# Patient Record
Sex: MALE | Race: WHITE | Employment: FULL TIME | URBAN - METROPOLITAN AREA
[De-identification: names, ages, dates, MRNs, and addresses within clinical notes are randomized per-mention and may not be internally consistent; named-entity substitution may affect disease eponyms.]

---

## 2017-05-08 ENCOUNTER — OFFICE VISIT (OUTPATIENT)
Dept: HEMATOLOGY | Age: 57
End: 2017-05-08

## 2017-05-08 ENCOUNTER — HOSPITAL ENCOUNTER (OUTPATIENT)
Dept: LAB | Age: 57
Discharge: HOME | End: 2017-05-08
Payer: COMMERCIAL

## 2017-05-08 VITALS
BODY MASS INDEX: 38.28 KG/M2 | DIASTOLIC BLOOD PRESSURE: 84 MMHG | SYSTOLIC BLOOD PRESSURE: 149 MMHG | TEMPERATURE: 97.5 F | HEART RATE: 73 BPM | OXYGEN SATURATION: 96 % | HEIGHT: 72 IN | RESPIRATION RATE: 18 BRPM | WEIGHT: 282.6 LBS

## 2017-05-08 DIAGNOSIS — R74.8 ELEVATED LIVER ENZYMES: ICD-10-CM

## 2017-05-08 DIAGNOSIS — R74.8 ELEVATED LIVER ENZYMES: Primary | ICD-10-CM

## 2017-05-08 PROBLEM — I10 HTN (HYPERTENSION): Status: ACTIVE | Noted: 2017-05-08

## 2017-05-08 PROBLEM — E11.9 DIABETES MELLITUS TYPE 2, CONTROLLED (HCC): Status: ACTIVE | Noted: 2017-05-08

## 2017-05-08 PROBLEM — G47.33 OSA (OBSTRUCTIVE SLEEP APNEA): Status: ACTIVE | Noted: 2017-05-08

## 2017-05-08 PROBLEM — K21.9 GERD (GASTROESOPHAGEAL REFLUX DISEASE): Status: ACTIVE | Noted: 2017-05-08

## 2017-05-08 PROBLEM — E78.5 HYPERLIPIDEMIA: Status: ACTIVE | Noted: 2017-05-08

## 2017-05-08 PROBLEM — F90.9 ADHD (ATTENTION DEFICIT HYPERACTIVITY DISORDER): Status: ACTIVE | Noted: 2017-05-08

## 2017-05-08 LAB
ALBUMIN SERPL BCP-MCNC: 4.2 G/DL (ref 3.4–5)
ALBUMIN/GLOB SERPL: 1.2 {RATIO} (ref 0.8–1.7)
ALP SERPL-CCNC: 49 U/L (ref 45–117)
ALT SERPL-CCNC: 83 U/L (ref 16–61)
ANION GAP BLD CALC-SCNC: 12 MMOL/L (ref 3–18)
AST SERPL W P-5'-P-CCNC: 62 U/L (ref 15–37)
BASOPHILS # BLD AUTO: 0 K/UL (ref 0–0.06)
BASOPHILS # BLD: 1 % (ref 0–2)
BILIRUB DIRECT SERPL-MCNC: 0.1 MG/DL (ref 0–0.2)
BILIRUB SERPL-MCNC: 0.5 MG/DL (ref 0.2–1)
BUN SERPL-MCNC: 21 MG/DL (ref 7–18)
BUN/CREAT SERPL: 17 (ref 12–20)
CALCIUM SERPL-MCNC: 9.5 MG/DL (ref 8.5–10.1)
CHLORIDE SERPL-SCNC: 104 MMOL/L (ref 100–108)
CO2 SERPL-SCNC: 26 MMOL/L (ref 21–32)
CREAT SERPL-MCNC: 1.24 MG/DL (ref 0.6–1.3)
DIFFERENTIAL METHOD BLD: ABNORMAL
EOSINOPHIL # BLD: 0.2 K/UL (ref 0–0.4)
EOSINOPHIL NFR BLD: 3 % (ref 0–5)
ERYTHROCYTE [DISTWIDTH] IN BLOOD BY AUTOMATED COUNT: 13.3 % (ref 11.6–14.5)
ETHANOL SERPL-MCNC: <3 MG/DL (ref 0–3)
FERRITIN SERPL-MCNC: 494 NG/ML (ref 8–388)
GLOBULIN SER CALC-MCNC: 3.6 G/DL (ref 2–4)
GLUCOSE SERPL-MCNC: 103 MG/DL (ref 74–99)
HCT VFR BLD AUTO: 39.1 % (ref 36–48)
HGB BLD-MCNC: 13.2 G/DL (ref 13–16)
INR PPP: 1 (ref 0.8–1.2)
IRON SATN MFR SERPL: 13 %
IRON SERPL-MCNC: 53 UG/DL (ref 50–175)
LYMPHOCYTES # BLD AUTO: 22 % (ref 21–52)
LYMPHOCYTES # BLD: 1.5 K/UL (ref 0.9–3.6)
MCH RBC QN AUTO: 30.3 PG (ref 24–34)
MCHC RBC AUTO-ENTMCNC: 33.8 G/DL (ref 31–37)
MCV RBC AUTO: 89.7 FL (ref 74–97)
MONOCYTES # BLD: 0.5 K/UL (ref 0.05–1.2)
MONOCYTES NFR BLD AUTO: 7 % (ref 3–10)
NEUTS SEG # BLD: 4.7 K/UL (ref 1.8–8)
NEUTS SEG NFR BLD AUTO: 67 % (ref 40–73)
PLATELET # BLD AUTO: 251 K/UL (ref 135–420)
PMV BLD AUTO: 9.5 FL (ref 9.2–11.8)
POTASSIUM SERPL-SCNC: 3.7 MMOL/L (ref 3.5–5.5)
PROT SERPL-MCNC: 7.8 G/DL (ref 6.4–8.2)
PROTHROMBIN TIME: 12.7 SEC (ref 11.5–15.2)
RBC # BLD AUTO: 4.36 M/UL (ref 4.7–5.5)
SODIUM SERPL-SCNC: 142 MMOL/L (ref 136–145)
TIBC SERPL-MCNC: 417 UG/DL (ref 250–450)
WBC # BLD AUTO: 6.9 K/UL (ref 4.6–13.2)

## 2017-05-08 PROCEDURE — 83540 ASSAY OF IRON: CPT | Performed by: INTERNAL MEDICINE

## 2017-05-08 PROCEDURE — 82728 ASSAY OF FERRITIN: CPT | Performed by: INTERNAL MEDICINE

## 2017-05-08 PROCEDURE — 87340 HEPATITIS B SURFACE AG IA: CPT | Performed by: INTERNAL MEDICINE

## 2017-05-08 PROCEDURE — 82103 ALPHA-1-ANTITRYPSIN TOTAL: CPT | Performed by: INTERNAL MEDICINE

## 2017-05-08 PROCEDURE — 82977 ASSAY OF GGT: CPT | Performed by: INTERNAL MEDICINE

## 2017-05-08 PROCEDURE — 82390 ASSAY OF CERULOPLASMIN: CPT | Performed by: INTERNAL MEDICINE

## 2017-05-08 PROCEDURE — 80048 BASIC METABOLIC PNL TOTAL CA: CPT | Performed by: INTERNAL MEDICINE

## 2017-05-08 PROCEDURE — 83516 IMMUNOASSAY NONANTIBODY: CPT | Performed by: INTERNAL MEDICINE

## 2017-05-08 PROCEDURE — 86708 HEPATITIS A ANTIBODY: CPT | Performed by: INTERNAL MEDICINE

## 2017-05-08 PROCEDURE — 36415 COLL VENOUS BLD VENIPUNCTURE: CPT | Performed by: INTERNAL MEDICINE

## 2017-05-08 PROCEDURE — 81256 HFE GENE: CPT | Performed by: INTERNAL MEDICINE

## 2017-05-08 PROCEDURE — 85610 PROTHROMBIN TIME: CPT | Performed by: INTERNAL MEDICINE

## 2017-05-08 PROCEDURE — 86704 HEP B CORE ANTIBODY TOTAL: CPT | Performed by: INTERNAL MEDICINE

## 2017-05-08 PROCEDURE — 86038 ANTINUCLEAR ANTIBODIES: CPT | Performed by: INTERNAL MEDICINE

## 2017-05-08 PROCEDURE — 80076 HEPATIC FUNCTION PANEL: CPT | Performed by: INTERNAL MEDICINE

## 2017-05-08 PROCEDURE — 85025 COMPLETE CBC W/AUTO DIFF WBC: CPT | Performed by: INTERNAL MEDICINE

## 2017-05-08 PROCEDURE — 82107 ALPHA-FETOPROTEIN L3: CPT | Performed by: INTERNAL MEDICINE

## 2017-05-08 PROCEDURE — 80307 DRUG TEST PRSMV CHEM ANLYZR: CPT | Performed by: INTERNAL MEDICINE

## 2017-05-08 PROCEDURE — 86706 HEP B SURFACE ANTIBODY: CPT | Performed by: INTERNAL MEDICINE

## 2017-05-08 NOTE — PROGRESS NOTES
93 Leanne Campos MD, QUINCY Collado PA-C Terris Bast, MD, FACP, MD Yusra Sanchez, QUINCY Callaway NP        [address] [facility], [address] [address]  22.     [phone]     FAX: 411 [address], [address]     [place], [address]     [phone]     FAX: [phone]           Patient Care Team:  Khanh Melendez MD as PCP - General (Family Practice)      Problem List  Date Reviewed: 3/10/2014          Codes Class Noted    Diabetes mellitus type 2, controlled ([facility] [address] ICD-10-CM: E11.9  ICD-9-CM: 250.00  5/8/2017        HTN (hypertension) ICD-10-CM: I10  ICD-9-CM: 401.9  5/8/2017        Hyperlipidemia ICD-10-CM: E78.5  ICD-9-CM: 272.4  5/8/2017        GERD (gastroesophageal reflux disease) ICD-10-CM: K21.9  ICD-9-CM: 530.81  5/8/2017        KAM (obstructive sleep apnea) ICD-10-CM: S92.44  ICD-9-CM: 327.23  5/8/2017        ADHD (attention deficit hyperactivity disorder) ICD-10-CM: F90.9  ICD-9-CM: 314.01  5/8/2017        Ureteral stone ICD-10-CM: N20.1  ICD-9-CM: 592.1  3/10/2014                The physicians listed above have asked me to see Maria Guadaluperosie Kimball. in consultation regarding elevated liver enzymes and elevated ferritin. No medical records were available for review when the patient was here for the appointment. The patient is a 60 y.o.  male who was first noted to have abnormalities in liver enzymes and ferritin in recent months during routine physical by PCM. Serologic evaluation for markers of chronic liver disease were either not performed or available to me. Patient reports having a negative viral hepatitis and HIV panel. Imaging of the liver was either not performed or the results are not available to me.       An assessment of liver fibrosis with biopsy or elastography has not been performed. Patient consumes 2-3 alcohlic beverages every day on average. Usually liquor. The patient had not started any new medications within 3 months preceeding the elevation in liver chemistries. He is on allopurinol for kidney stones. The patient has no symptoms which could be attributed to the liver disorder. The patient completes all daily activities without any functional limitations. ALLERGIES  Allergies   Allergen Reactions    Bee Sting [Sting, Bee] Swelling       MEDICATIONS  Current Outpatient Prescriptions   Medication Sig    oxyCODONE-acetaminophen (PERCOCET)  mg per tablet Take 1 Tab by mouth every four (4) hours as needed for Pain.  oxyCODONE-acetaminophen (PERCOCET)  mg per tablet Take 1 Tab by mouth every four (4) hours as needed for Pain.  lansoprazole (PREVACID) 30 mg capsule Take 30 mg by mouth Daily (before breakfast). Takes in afternoon    levofloxacin (LEVAQUIN) 500 mg tablet Take 500 mg by mouth daily.  atorvastatin (LIPITOR) 40 mg tablet Take 40 mg by mouth daily.  fenofibrate (TRICOR) 160 mg tablet Take 160 mg by mouth daily.  fluticasone (FLONASE) 50 mcg/actuation nasal spray 2 Sprays by Both Nostrils route daily.  HYDROcodone-acetaminophen (NORCO) 5-325 mg per tablet Take 1 Tab by mouth every four (4) hours as needed for Pain.  aspirin delayed-release 81 mg tablet Take 81 mg by mouth daily.  tamsulosin (FLOMAX) 0.4 mg capsule Take 0.4 mg by mouth daily. No current facility-administered medications for this visit. SYSTEM REVIEW NOT RELATED TO LIVER DISEASE OR REVIEWED ABOVE:  Constitution systems: Negative for fever, chills, weight gain, weight loss. Eyes: Negative for visual changes. ENT: Negative for sore throat, painful swallowing. Respiratory: Negative for cough, hemoptysis, SOB. Cardiology: Negative for chest pain, palpitations.   GI:  Negative for constipation or diarrhea. : Negative for urinary frequency, dysuria, hematuria, nocturia. Skin: Negative for rash. Hematology: Negative for easy bruising, blood clots. Musculo-skelatal: Negative for back pain, muscle pain, weakness. Neurologic: Negative for headaches, dizziness, vertigo, memory problems not related to HE. Psychology: Negative for anxiety, depression. FAMILY HISTORY:    There is no family history of liver disease. SOCIAL HISTORY:  The patient is . The patient has 2  Children. The patient quit smoking 13 years ago. The patient consumes 2-3 alcoholic (liquor) beverages per day on average. 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 PHYSICAL EXAMINATION:  Visit Vitals    /84 (BP 1 Location: Left arm, BP Patient Position: Sitting)    Pulse 73    Temp 97.5 °F (36.4 °C) (Tympanic)    Resp 18    Ht 6' (1.829 m)    Wt 282 lb 9.6 oz (128.2 kg)    SpO2 96%    BMI 38.33 kg/m2     General: No acute distress. Eyes: Sclera anicteric. ENT: No oral lesions. Thyroid normal.  Nodes: No adenopathy. Skin: No spider angiomata. No jaundice. No palmar erythema. Respiratory: Lungs clear to auscultation. Cardiovascular: Regular heart rate. No murmurs. No JVD. Abdomen: Soft non-tender. Liver size normal to percussion/palpation. Spleen not palpable. No obvious ascites. Extremities: No edema. No muscle wasting. No gross arthritic changes. Neurologic: Alert and oriented. Cranial nerves grossly intact. No asterixis. LABORATORY STUDIES:  [facility] of [address] [place] [address] & Units 3/7/2014   HGB 13.0 - 16.0 g/dL 12. 6(L)   BUN 7.0 - 18 MG/DL 27(H)   Creat 0.6 - 1.3 MG/DL 2.04(H)   Na 136 - 145 mmol/L 141   K 3.5 - 5.5 mmol/L 4.1   Cl 100 - 108 mmol/L 106   CO2 21 - 32 mmol/L 24   Glucose 74 - 99 mg/dL 168(H)       Recent liver function panel, CBC with platelet count and BMP are not available.   These studies will be performed. SEROLOGIES:  Not available or performed. Testing was performed today. LIVER HISTOLOGY:  Not available or performed    ENDOSCOPIC PROCEDURES:  Not available or performed    RADIOLOGY:  Not available or performed    OTHER TESTING:  Not available or performed    ASSESSMENT AND PLAN:    Elevation in liver enzymes and ferritin of unknown chronicity or etiology. I do not have labs to review. I can see the office note from [facility] Dr. Ev Haney mentions elevated ferritin and transaminases. I suspect he may have fatty liver due to ETOH and metabolic syndrome. There ferritin may be due to secondary iron overload as a result. Will perform additional serologic tests to screen for other causes of chronic liver disease. I will also check mutation for hemochromatosis. Will perform imaging of the liver with ultrasound. Elastography will be performed to assess for fibrosis. The patient was counseled regarding alcohol consumption. The need for vaccination against viral hepatitis A and B will be assessed with serologic and instituted as appropriate. All of the above issues were discussed with the patient. All questions were answered. The patient expressed a clear understanding of the above. 11 [facility] in 4-6 weeks to review all data and determine the treatment plan.               Johana Carmen MD  Liver [place] of [address], [address] [facility], [address]  [phone]  FAX: [phone]

## 2017-05-08 NOTE — MR AVS SNAPSHOT
Visit Information Date & Time Provider Department Dept. Phone Encounter #  
 5/8/2017  3:40 PM Robina Mathew MD Liver [place] of [facility] [id number] Upcoming Health Maintenance Date Due Hepatitis C Screening 1960 DTaP/Tdap/Td series (1 - Tdap) 12/18/1979 FOBT Q 1 YEAR AGE 50-75 12/18/2008 INFLUENZA AGE 9 TO ADULT 8/1/2017 Allergies as of 5/8/2017  Review Complete On: 5/8/2017 By: Stacey Howell Severity Noted Reaction Type Reactions Bee Sting [Sting, Bee]  03/10/2014    Swelling Current Immunizations  Never Reviewed No immunizations on file. Not reviewed this visit You Were Diagnosed With   
  
 Codes Comments Elevated liver enzymes    -  Primary ICD-10-CM: R74.8 ICD-9-CM: 790.5 Vitals BP Pulse Temp Resp Height(growth percentile) 149/84 (BP 1 Location: Left arm, BP Patient Position: Sitting) 73 97.5 °F (36.4 °C) (Tympanic) 18 6' (1.829 m) Weight(growth percentile) SpO2 BMI Smoking Status 282 lb 9.6 oz (128.2 kg) 96% 38.33 kg/m2 Former Smoker Vitals History BMI and BSA Data Body Mass Index Body Surface Area  
 38.33 kg/m 2 2.55 m 2 Your Updated Medication List  
  
   
This list is accurate as of: 5/8/17  4:26 PM.  Always use your most recent med list.  
  
  
  
  
 aspirin delayed-release 81 mg tablet Take 81 mg by mouth daily. atorvastatin 40 mg tablet Commonly known as:  LIPITOR Take 40 mg by mouth daily. fenofibrate 160 mg tablet Commonly known as:  LOFIBRA Take 160 mg by mouth daily. fluticasone 50 mcg/actuation nasal spray Commonly known as:  Florin Bumpers 2 Sprays by Both Nostrils route daily. HYDROcodone-acetaminophen 5-325 mg per tablet Commonly known as:  Felicia Urban Take 1 Tab by mouth every four (4) hours as needed for Pain.  
  
 lansoprazole 30 mg capsule Commonly known as:  PREVACID  
 Take 30 mg by mouth Daily (before breakfast). Takes in afternoon  
  
 levoFLOXacin 500 mg tablet Commonly known as:  Phyllis Sleeper Take 500 mg by mouth daily. * oxyCODONE-acetaminophen  mg per tablet Commonly known as:  PERCOCET Take 1 Tab by mouth every four (4) hours as needed for Pain. * oxyCODONE-acetaminophen  mg per tablet Commonly known as:  PERCOCET Take 1 Tab by mouth every four (4) hours as needed for Pain.  
  
 tamsulosin 0.4 mg capsule Commonly known as:  FLOMAX Take 0.4 mg by mouth daily. * Notice: This list has 2 medication(s) that are the same as other medications prescribed for you. Read the directions carefully, and ask your doctor or other care provider to review them with you. To-Do List   
 05/08/2017 Lab:  ACTIN (SMOOTH MUSCLE) ANTIBODY   
  
 05/08/2017 Lab:  AFP WITH AFP-L3% 05/08/2017 Lab:  ALPHA-1-ANTITRYPSIN, TOTAL   
  
 05/08/2017 Lab:  ANTINUCLEAR ANTIBODIES, IFA   
  
 05/08/2017 Lab:  CBC WITH AUTOMATED DIFF   
  
 05/08/2017 Lab:  CERULOPLASMIN   
  
 05/08/2017 Lab:  ETHYL ALCOHOL   
  
 05/08/2017 Lab:  FERRITIN   
  
 05/08/2017 Lab:  GGT   
  
 05/08/2017 Lab:  HEMOCHROMATOSIS MUTATION   
  
 05/08/2017 Lab:  HEP A AB, TOTAL   
  
 05/08/2017 Lab:  HEP B SURFACE AB   
  
 05/08/2017 Lab:  HEP B SURFACE AG   
  
 05/08/2017 Lab:  HEPATIC FUNCTION PANEL   
  
 05/08/2017 Lab:  HEPATITIS B CORE AB, TOTAL   
  
 05/08/2017 Lab:  IRON PROFILE   
  
 05/08/2017 Lab:  METABOLIC PANEL, BASIC   
  
 05/08/2017 Lab:  MITOCHONDRIAL M2 AB   
  
 05/08/2017 Lab:  PROTHROMBIN TIME + INR   
  
 05/08/2017 Imaging:  US ABD LTD W ELASTOGRAPHY Introducing [facility]! [facility] introduces [employer] patient portal. Now you can access parts of your medical record, email your doctor's office, and request medication refills online. 1. In your internet browser, go to [email or web address]. [employer] 2. Click on the First Time User? Click Here link in the Sign In box. You will see the New Member Sign Up page. 3. Enter your Dime Access Code exactly as it appears below. You will not need to use this code after youve completed the sign-up process. If you do not sign up before the expiration date, you must request a new code. · Dime Access Code: [email or web address] Expires: 8/6/2017  4:25 PM 
 
4. Enter the last four digits of your Social Security Number (xxxx) and Date of Birth (mm/dd/yyyy) as indicated and click Submit. You will be taken to the next sign-up page. 5. Create a Summlyt ID. This will be your Dime login ID and cannot be changed, so think of one that is secure and easy to remember. 6. Create a Dime password. You can change your password at any time. 7. Enter your Password Reset Question and Answer. This can be used at a later time if you forget your password. 8. Enter your e-mail address. You will receive e-mail notification when new information is available in [address] 9. Click Sign Up. You can now view and download portions of your medical record. 10. Click the Download Summary menu link to download a portable copy of your medical information. If you have questions, please visit the Frequently Asked Questions section of the Dime website. Remember, Dime is NOT to be used for urgent needs. For medical emergencies, dial 911. Now available from your iPhone and Android! Please provide this summary of care documentation to your next provider. Your primary care clinician is listed as Barbie Galdamez. If you have any questions after today's visit, please call [phone].

## 2017-05-09 LAB
A1AT SERPL-MCNC: 142 MG/DL (ref 90–200)
ACTIN IGG SERPL-ACNC: 19 UNITS (ref 0–19)
AFP L3 MFR SERPL: NORMAL % (ref 0–9.9)
AFP SERPL-MCNC: 2.5 NG/ML (ref 0–8)
ANA TITR SER IF: NEGATIVE {TITER}
CERULOPLASMIN SERPL-MCNC: 27.3 MG/DL (ref 16–31)
GGT SERPL-CCNC: 50 IU/L (ref 0–65)
HAV AB SER QL IA: NEGATIVE
HBV CORE AB SERPL QL IA: NEGATIVE
HBV SURFACE AB SER QL IA: POSITIVE
HBV SURFACE AB SERPL IA-ACNC: 55.3 MIU/ML
HBV SURFACE AG SER QL: <0.1 INDEX
HBV SURFACE AG SER QL: NEGATIVE
HEP BS AB COMMENT,HBSAC: NORMAL
MITOCHONDRIA M2 IGG SER-ACNC: 4.5 UNITS (ref 0–20)

## 2017-05-12 LAB — HFE GENE MUT ANL BLD/T: NORMAL

## 2017-07-13 ENCOUNTER — HOSPITAL ENCOUNTER (OUTPATIENT)
Dept: ULTRASOUND IMAGING | Age: 57
Discharge: HOME | End: 2017-07-13
Attending: INTERNAL MEDICINE
Payer: COMMERCIAL

## 2017-07-13 DIAGNOSIS — R74.8 ELEVATED LIVER ENZYMES: ICD-10-CM

## 2017-07-13 PROCEDURE — 0346T US ABD LTD W ELASTOGRAPHY: CPT

## 2018-04-04 ENCOUNTER — HOSPITAL ENCOUNTER (OUTPATIENT)
Dept: LAB | Age: 58
Discharge: HOME | End: 2018-04-04
Payer: COMMERCIAL

## 2018-04-04 ENCOUNTER — OFFICE VISIT (OUTPATIENT)
Dept: HEMATOLOGY | Age: 58
End: 2018-04-04

## 2018-04-04 VITALS
OXYGEN SATURATION: 96 % | DIASTOLIC BLOOD PRESSURE: 88 MMHG | BODY MASS INDEX: 37.11 KG/M2 | HEART RATE: 64 BPM | HEIGHT: 72 IN | SYSTOLIC BLOOD PRESSURE: 150 MMHG | WEIGHT: 274 LBS | TEMPERATURE: 99.5 F | RESPIRATION RATE: 18 BRPM

## 2018-04-04 DIAGNOSIS — K76.0 NAFL (NONALCOHOLIC FATTY LIVER): Primary | ICD-10-CM

## 2018-04-04 DIAGNOSIS — K76.0 NAFL (NONALCOHOLIC FATTY LIVER): ICD-10-CM

## 2018-04-04 LAB
ALBUMIN SERPL-MCNC: 4.4 G/DL (ref 3.4–5)
ALBUMIN/GLOB SERPL: 1.4 {RATIO} (ref 0.8–1.7)
ALP SERPL-CCNC: 52 U/L (ref 45–117)
ALT SERPL-CCNC: 44 U/L (ref 16–61)
AST SERPL-CCNC: 30 U/L (ref 15–37)
BILIRUB DIRECT SERPL-MCNC: 0.1 MG/DL (ref 0–0.2)
BILIRUB SERPL-MCNC: 0.5 MG/DL (ref 0.2–1)
GLOBULIN SER CALC-MCNC: 3.2 G/DL (ref 2–4)
PROT SERPL-MCNC: 7.6 G/DL (ref 6.4–8.2)

## 2018-04-04 PROCEDURE — 36415 COLL VENOUS BLD VENIPUNCTURE: CPT | Performed by: INTERNAL MEDICINE

## 2018-04-04 PROCEDURE — 80076 HEPATIC FUNCTION PANEL: CPT | Performed by: INTERNAL MEDICINE

## 2018-04-04 RX ORDER — ALLOPURINOL 100 MG/1
TABLET ORAL DAILY
COMMUNITY

## 2018-04-04 NOTE — PROGRESS NOTES
MD Avi, [address], [place], [place]       Alyssa Wild, ELLIS Garcia, PEG-BC   Beronica Epps, QUINCY [facility]    at [address]    [address] [facility], [address] [place] [address], [facility] 22.    [phone]    FAX: [address] [place] [address], [address]    [phone]    FAX: [phone]       Patient Care Team:  Jorge Armijo MD as PCP - General (Family Practice)      Problem List  Date Reviewed: 3/10/2014          Codes Class Noted    Diabetes mellitus type 2, controlled ([facility] [address] ICD-10-CM: E11.9  ICD-9-CM: 250.00  5/8/2017        HTN (hypertension) ICD-10-CM: I10  ICD-9-CM: 401.9  5/8/2017        Hyperlipidemia ICD-10-CM: E78.5  ICD-9-CM: 272.4  5/8/2017        GERD (gastroesophageal reflux disease) ICD-10-CM: K21.9  ICD-9-CM: 530.81  5/8/2017        KAM (obstructive sleep apnea) ICD-10-CM: M91.71  ICD-9-CM: 327.23  5/8/2017        ADHD (attention deficit hyperactivity disorder) ICD-10-CM: F90.9  ICD-9-CM: 314.01  5/8/2017        Ureteral stone ICD-10-CM: N20.1  ICD-9-CM: 592.1  3/10/2014              Sunitarosie Trent. returns to the [address] for management of elevated liver enzymes. The active problem list, all pertinent past medical history, medications, radiologic findings and laboratory findings related to the liver disorder were reviewed with the patient. The patient is a 59 y.o.  male who was noted to have abnormalities in liver enzymes and ferritin in recent months during routine examination. The most recent imaging of the liver was Ultrasound performed in 7/2017. Results suggest fatty liver disease.       Assessment of liver fibrosis was performed with sheer wave elastogrphy at [facility] in 7/2017. The result was 5.4 kPa which correlates no fibrosis - stage 1 portal fibrosis    The patient has no symptoms which can be attributed to the liver disorder. The patient has not experienced fatigue, pain in the right side over the liver,     The patient completes all daily activities without any functional limitations. All of the issues listed in the Assessment and Plan were discussed with the patient. All questions were answered. The patient expressed a clear understanding of the above. [facility] 87 in 6 months for elastography for routine monitoring. ASSESSMENT AND PLAN:  Intermittent elevation in liver transaminases   Liver transaminases are now normal.  ALP is normal.  Liver function is normal.  The platelet count is normal.    Based upon laboratory studies Elastography, and imaging  the patient does not appear to have significant liver injury. Serologic testing for causes of chronic liver disease were negative     There is no reason to perform liver biopsy at this time. Liver chemistries will be monitored. The most likely causes for the liver chemistry abnormalities were discussed with the patient and include fatty liver disease, most likely from NAFLD but given daily alcohol consumption some may be due to alcohol. The patient was advised to reduce his alcohol consumption is non-daily and see if this helps the liver enzymes. The patient was counseled regarding diet and exercise to achieve weight loss. The best diet for patients with fatty liver is one very low in carbohydrates and enriched with protein such as an Abdullahi's program.    The patient was told to consume any food products and drinks containing fructose as this enhances hepatic fat synthesis. There is no medication or vitamin supplements that we advocate for fatty liver.   Using glitazones in patients without diabetes mellitus has been shown to reduce fat content in the liver but has no effect on fibrosis and is associated with weight gain. Vitamin E has also been used but the data is not very good and most experts no longer advocate this. The need to assess liver fibrosis was discussed. Sheer wave elastography can assess liver fibrosis and determine if a patient has advanced fibrosis or cirrhosis without the need for liver biopsy. Sheer wave elastography is now available at [facility]. Elastography can be repeated annually to assess for fibrosis progression and need for treatment of the liver disorder. If liver enzymes remain normal and elastography is a bit better in 6 months than either no further follow-up is needed or frequency of repeat elastography could be reduced to every 2 years. Treatment of other medical problems in patients with chronic liver disease  There are no contraindications for the patient to take any medications that are necessary for treatment of other medical issues. The patient can take oral diabetic agents for treatment of diabetes and statins for treatment of hypercholesterolemia. This will not impact the patient's current liver disease. The patient consumes alcohol on a regular basis. This increases the risk of toxicity from acetaminophen. This analgesic should be avoided until the patient has been abstinent from alcohol for 6 months. The patient does not have cirrhosis. Normal doses of NSAIDs can be used for pain as needed. Counseling for alcohol in patients with chronic liver disease  The patient was counseled regarding alcohol consumption and the effect of alcohol on chronic liver disease. The patient was reminded that alcohol can cause fatty liver. Vaccinations   Vaccination for viral hepatitis A is recommended since the patient has no serologic evidence of previous exposure or vaccination with immunity. Vaccination for viral hepatitis B is not needed.   The patient has serologic evidence of prior exposure or vaccination with immunity. Routine vaccinations against other bacterial and viral agents can be performed as indicated. Annual flu vaccination should be administered if indicated. ALLERGIES  Allergies   Allergen Reactions    Bee Sting [Sting, Bee] Swelling       MEDICATIONS  Current Outpatient Prescriptions   Medication Sig    losartan 50 mg tab 100 mg, hydroCHLOROthiazide 12.5 mg cap 12.5 mg Take  by mouth daily.  allopurinol (ZYLOPRIM) 100 mg tablet Take  by mouth daily.  DEXTROAMPHETAMINE/AMPHETAMINE (AMPHETAMINE SALT COMBO PO) Take 15 mg by mouth.  atorvastatin (LIPITOR) 40 mg tablet Take 40 mg by mouth daily.  fenofibrate (TRICOR) 160 mg tablet Take 160 mg by mouth daily.  fluticasone (FLONASE) 50 mcg/actuation nasal spray 2 Sprays by Both Nostrils route daily.  aspirin delayed-release 81 mg tablet Take 81 mg by mouth daily.  oxyCODONE-acetaminophen (PERCOCET)  mg per tablet Take 1 Tab by mouth every four (4) hours as needed for Pain.  oxyCODONE-acetaminophen (PERCOCET)  mg per tablet Take 1 Tab by mouth every four (4) hours as needed for Pain.  lansoprazole (PREVACID) 30 mg capsule Take 30 mg by mouth Daily (before breakfast). Takes in afternoon    tamsulosin (FLOMAX) 0.4 mg capsule Take 0.4 mg by mouth daily.  levofloxacin (LEVAQUIN) 500 mg tablet Take 500 mg by mouth daily.  HYDROcodone-acetaminophen (NORCO) 5-325 mg per tablet Take 1 Tab by mouth every four (4) hours as needed for Pain. No current facility-administered medications for this visit. SYSTEM REVIEW NOT RELATED TO LIVER DISEASE OR REVIEWED ABOVE:  Constitution systems: Negative for fever, chills, weight gain, weight loss. Eyes: Negative for visual changes. ENT: Negative for sore throat, painful swallowing. Respiratory: Negative for cough, hemoptysis, SOB. Cardiology: Negative for chest pain, palpitations. GI:  Negative for constipation or diarrhea.   : Negative for urinary frequency, dysuria, hematuria, nocturia. Skin: Negative for rash. Hematology: Negative for easy bruising, blood clots. Musculo-skelatal: Negative for back pain, muscle pain, weakness. Neurologic: Negative for headaches, dizziness, vertigo, memory problems not related to HE. Psychology: Negative for anxiety, depression. FAMILY HISTORY:  The father  of heart disaase  Mother  of cancer  There is no family history of liver disease. SOCIAL HISTORY:  The patient is . The patient has 2  Children. The patient quit smoking . The patient consumes 2-3 alcoholic beverages per day on average. 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 PHYSICAL EXAMINATION:  Visit Vitals    /88 (BP 1 Location: Right arm, BP Patient Position: Sitting)    Pulse 64    Temp 99.5 °F (37.5 °C) (Tympanic)    Resp 18    Ht 6' (1.829 m)    Wt 274 lb (124.3 kg)    SpO2 96%    BMI 37.16 kg/m2     General: No acute distress. Eyes: Sclera anicteric. ENT: No oral lesions. Thyroid normal.  Nodes: No adenopathy. Skin: No spider angiomata. No jaundice. No palmar erythema. Respiratory: Lungs clear to auscultation. Cardiovascular: Regular heart rate. No murmurs. No JVD. Abdomen: Soft non-tender. Liver size normal to percussion/palpation. Spleen not palpable. No obvious ascites. Extremities: No edema. No muscle wasting. No gross arthritic changes. Neurologic: Alert and oriented. Cranial nerves grossly intact. No asterixis.     LABORATORY STUDIES:  Liver [place] of [address] Units 2018   WBC 4.6 - 13.2 K/uL  6.9   ANC 1.8 - 8.0 K/UL  4.7   HGB 13.0 - 16.0 g/dL  13.2    - 420 K/uL  251   INR 0.8 - 1.2    1.0   AST 15 - 37 U/L 30 62 (H)   ALT 16 - 61 U/L 44 83 (H)   Alk Phos 45 - 117 U/L 52 49   Bili, Total 0.2 - 1.0 MG/DL 0.5 0.5   Bili, Direct 0.0 - 0.2 MG/DL 0.1 0.1   Albumin 3.4 - 5.0 g/dL 4.4 4.2   BUN 7.0 - 18 MG/DL  21 (H) Creat 0.6 - 1.3 MG/DL  1.24   Na 136 - 145 mmol/L  142   K 3.5 - 5.5 mmol/L  3.7   Cl 100 - 108 mmol/L  104   CO2 21 - 32 mmol/L  26   Glucose 74 - 99 mg/dL  103 (H)     SEROLOGIES:  Serologies Latest Ref Rng & Units 5/8/2017   Hep A Ab, Total NEGATIVE   NEGATIVE   Hep B Surface Ag <1.00 Index <0.10   Hep B Surface Ag Interp NEG   NEGATIVE   Hep B Core Ab, Total NEGATIVE   NEGATIVE   Hep B Surface Ab >10.0 mIU/mL 55.30   Hep B Surface Ab Interp POS   POSITIVE   Ferritin 8 - 388 NG/ (H)   Iron % Saturation % 13   JOEL, IFA  NEGATIVE   ASMCA 0 - 19 Units 19   M2 Ab 0.0 - 20.0 Units 4.5   Ceruloplasmin 16.0 - 31.0 mg/dL 27.3   Alpha-1 antitrypsin level 90 - 200 mg/dL 142     LIVER HISTOLOGY:  7/2017. Sheer wave elastography performed at [facility]. E Range: 4.2-7 kPa, E Mean: 5.4 kPa, E Median: 5.4 kPa, E Std: 0.96 kPa. The results suggested a fibrosis level of F0-1. ENDOSCOPIC PROCEDURES:  Not available or performed    RADIOLOGY:  7/2017. Ultrasound of liver. Echogenic consistent with fatty liver. No liver mass lesions. No dilated bile ducts. No ascites.     OTHER TESTING:  Not available or performed      Madisyn Abdullahi MD  [id number] [employer]  [address] [place] [address] 58, [address]  [address] [place] [address]

## 2018-04-04 NOTE — PROGRESS NOTES
Palak Fletcher. is a 59 y.o. male    No chief complaint on file. 1. Have you been to the ER, urgent care clinic or hospitalized since your last visit? YES.     2. Have you seen or consulted any other health care providers outside of the [address] [place] [address] since your last visit (Include any pap smears or colon screening)? YES    Patient went to urgent care for pink eye since last visit.         Learning Assessment 4/4/2018   PRIMARY LEARNER Patient   BARRIERS PRIMARY LEARNER NONE   CO-LEARNER CAREGIVER No   PRIMARY LANGUAGE ENGLISH   LEARNER PREFERENCE PRIMARY LISTENING   ANSWERED BY patient   RELATIONSHIP SELF

## 2018-04-04 NOTE — MR AVS SNAPSHOT
[address] [address] 
[phone] Patient: Reyna Martin. MRN: [id number] JOEY:[id number] Visit Information Date & Time Provider Department Dept. Phone Encounter #  
 4/4/2018  5:00 PM [facility]  [address] [id number] Follow-up Instructions Return in about 6 months (around 10/4/2018) for [place]. Upcoming Health Maintenance Date Due  
 FOOT EXAM Q1 12/18/1968 EYE EXAM RETINAL OR DILATED Q1 12/18/1968 Pneumococcal 19-64 Medium Risk (1 of 1 - PPSV23) 12/18/1977 DTaP/Tdap/Td series (1 - Tdap) 12/18/1979 FOBT Q 1 YEAR AGE 50-75 12/18/2008 Influenza Age 5 to Adult 8/1/2017 HEMOGLOBIN A1C Q6M 9/22/2017 MICROALBUMIN Q1 3/22/2018 LIPID PANEL Q1 3/22/2018 Allergies as of 4/4/2018  Review Complete On: 4/4/2018 By: Junior Chaudhari Severity Noted Reaction Type Reactions Bee Sting [Sting, Bee]  03/10/2014    Swelling Current Immunizations  Never Reviewed No immunizations on file. Not reviewed this visit You Were Diagnosed With   
  
 Codes Comments NAFL (nonalcoholic fatty liver)    -  Primary ICD-10-CM: K76.0 ICD-9-CM: 571.8 Vitals BP Pulse Temp Resp Height(growth percentile) 150/88 (BP 1 Location: Right arm, BP Patient Position: Sitting) 64 99.5 °F (37.5 °C) (Tympanic) 18 6' (1.829 m) Weight(growth percentile) SpO2 BMI Smoking Status 274 lb (124.3 kg) 96% 37.16 kg/m2 Former Smoker BMI and BSA Data Body Mass Index Body Surface Area  
 37.16 kg/m 2 2.51 m 2 Your Updated Medication List  
  
   
This list is accurate as of 4/4/18  5:17 PM.  Always use your most recent med list.  
  
  
  
  
 allopurinol 100 mg tablet Commonly known as:  Jerrye Munda Take  by mouth daily. AMPHETAMINE SALT COMBO PO Take 15 mg by mouth. aspirin delayed-release 81 mg tablet Take 81 mg by mouth daily. atorvastatin 40 mg tablet Commonly known as:  LIPITOR Take 40 mg by mouth daily. fenofibrate 160 mg tablet Commonly known as:  LOFIBRA Take 160 mg by mouth daily. fluticasone 50 mcg/actuation nasal spray Commonly known as:  Francesca Plater 2 Sprays by Both Nostrils route daily. HYDROcodone-acetaminophen 5-325 mg per tablet Commonly known as:  Malena Shake Take 1 Tab by mouth every four (4) hours as needed for Pain.  
  
 lansoprazole 30 mg capsule Commonly known as:  PREVACID Take 30 mg by mouth Daily (before breakfast). Takes in afternoon  
  
 levoFLOXacin 500 mg tablet Commonly known as:  Kennth [place] Take 500 mg by mouth daily. losartan 50 mg tab 100 mg, hydroCHLOROthiazide 12.5 mg cap 12.5 mg Take  by mouth daily. * oxyCODONE-acetaminophen  mg per tablet Commonly known as:  PERCOCET Take 1 Tab by mouth every four (4) hours as needed for Pain. * oxyCODONE-acetaminophen  mg per tablet Commonly known as:  PERCOCET Take 1 Tab by mouth every four (4) hours as needed for Pain.  
  
 tamsulosin 0.4 mg capsule Commonly known as:  FLOMAX Take 0.4 mg by mouth daily. * Notice: This list has 2 medication(s) that are the same as other medications prescribed for you. Read the directions carefully, and ask your doctor or other care provider to review them with you. Follow-up Instructions Return in about 6 months (around 10/4/2018) for Kyra Frankel. To-Do List   
 04/04/2018 Lab:  HEPATIC FUNCTION PANEL   
  
 04/04/2018 Imaging:  US ABD LTD W ELASTOGRAPHY Introducing [facility]! [facility] introduces [employer] patient portal. Now you can access parts of your medical record, email your doctor's office, and request medication refills online. 1. In your internet browser, go to [email or web address] 2. Click on the First Time User? Click Here link in the Sign In box. You will see the New Member Sign Up page. 3. Enter your [employer] Access Code exactly as it appears below. You will not need to use this code after youve completed the sign-up process. If you do not sign up before the expiration date, you must request a new code. · [employer] Access Code: [email or web address] Expires: 7/3/2018  5:17 PM 
 
4. Enter the last four digits of your Social Security Number (xxxx) and Date of Birth (mm/dd/yyyy) as indicated and click Submit. You will be taken to the next sign-up page. 5. Create a [employer] ID. This will be your [employer] login ID and cannot be changed, so think of one that is secure and easy to remember. 6. Create a [employer] password. You can change your password at any time. 7. Enter your Password Reset Question and Answer. This can be used at a later time if you forget your password. 8. Enter your e-mail address. You will receive e-mail notification when new information is available in [address] 9. Click Sign Up. You can now view and download portions of your medical record. 10. Click the Download Summary menu link to download a portable copy of your medical information. If you have questions, please visit the Frequently Asked Questions section of the [employer] website. Remember, [employer] is NOT to be used for urgent needs. For medical emergencies, dial 911. Now available from your iPhone and Android! Please provide this summary of care documentation to your next provider. Your primary care clinician is listed as Jacqueline Leyva. If you have any questions after today's visit, please call [phone].

## 2018-09-10 ENCOUNTER — HOSPITAL ENCOUNTER (OUTPATIENT)
Dept: ULTRASOUND IMAGING | Age: 58
Discharge: HOME | End: 2018-09-10
Attending: INTERNAL MEDICINE
Payer: COMMERCIAL

## 2018-09-10 DIAGNOSIS — K76.0 NAFL (NONALCOHOLIC FATTY LIVER): ICD-10-CM

## 2018-09-10 PROCEDURE — 0346T US ABD LTD W ELASTOGRAPHY: CPT

## 2018-10-04 ENCOUNTER — OFFICE VISIT (OUTPATIENT)
Dept: HEMATOLOGY | Age: 58
End: 2018-10-04

## 2018-10-04 VITALS
SYSTOLIC BLOOD PRESSURE: 155 MMHG | HEART RATE: 82 BPM | DIASTOLIC BLOOD PRESSURE: 81 MMHG | HEIGHT: 72 IN | WEIGHT: 278 LBS | RESPIRATION RATE: 20 BRPM | TEMPERATURE: 98 F | BODY MASS INDEX: 37.65 KG/M2 | OXYGEN SATURATION: 98 %

## 2018-10-04 DIAGNOSIS — K76.0 NAFL (NONALCOHOLIC FATTY LIVER): Primary | ICD-10-CM

## 2018-10-04 PROBLEM — E11.9 DIABETES MELLITUS TYPE 2, CONTROLLED (HCC): Status: RESOLVED | Noted: 2017-05-08 | Resolved: 2018-10-04

## 2018-10-04 NOTE — PROGRESS NOTES
245 [facility] [address], [place], [address], [place], [place]       Fidel Baron, NP Claudene Lobo, ELLIS Majano, [facility]   Wiley Hamman, NP Ignacia Fortes, NP [address] [facility] [address]    at [address]    217 [facility], [address] Mina Encarnacion  22.    [phone]    FAX: [address] [place] [address] Avenue    [address], [address]    [phone]    FAX: [phone]       Patient Care Team:  Edenilson Brothers MD as PCP - General (Family Practice)      Problem List  Date Reviewed: 10/10/2018          Codes Class Noted    NAFL (nonalcoholic fatty liver) CWJ-64-AB: K76.0  ICD-9-CM: 571.8  10/10/2018        HTN (hypertension) ICD-10-CM: I10  ICD-9-CM: 401.9  5/8/2017        Hyperlipidemia ICD-10-CM: E78.5  ICD-9-CM: 272.4  5/8/2017        GERD (gastroesophageal reflux disease) ICD-10-CM: K21.9  ICD-9-CM: 530.81  5/8/2017        KAM (obstructive sleep apnea) ICD-10-CM: P67.69  ICD-9-CM: 327.23  5/8/2017        ADHD (attention deficit hyperactivity disorder) ICD-10-CM: F90.9  ICD-9-CM: 314.01  5/8/2017        Ureteral stone ICD-10-CM: N20.1  ICD-9-CM: 592.1  3/10/2014              Augusto Chan. returns to the [address] [address] for management of elevated liver enzymes. The active problem list, all pertinent past medical history, medications, radiologic findings and laboratory findings related to the liver disorder were reviewed with the patient. The patient is a 59 y.o.  male who was noted to have abnormalities in liver enzymes and ferritin in recent months during routine examination. The most recent imaging of the liver was Ultrasound performed in 9/2018. Results suggest fatty liver disease.       Assessment of liver fibrosis was performed with sheer wave elastogrphy at [facility] in 9/2018. The result was 5.43 kPa which correlates no fibrosis - stage 1 portal fibrosis    The patient has no symptoms which can be attributed to the liver disorder. The patient has not experienced fatigue, pain in the right side over the liver. The patient completes all daily activities without any functional limitations. ASSESSMENT AND PLAN:  Intermittent elevation in liver transaminases   Liver transaminases are now normal.  ALP is normal.  Liver function is normal.  The platelet count is normal.    Based upon laboratory studies, Elastography, and imaging  the patient does not appear to have significant liver injury. Serologic testing for causes of chronic liver disease were negative     There is no reason to perform liver biopsy at this time. Liver chemistries will be monitored. The most likely causes for the liver chemistry abnormalities were discussed with the patient and include fatty liver disease, most likely from NAFLD but given daily alcohol consumption some may be due to alcohol. The patient was advised to reduce his alcohol consumption and see if this helps the liver enzymes. The patient was counseled regarding diet and exercise to achieve weight loss. The best diet for patients with fatty liver is one very low in carbohydrates and enriched with protein such as an Abdullahi's program.    The patient was told not to consume any food products and drinks containing fructose as this enhances hepatic fat synthesis. There is no medication or vitamin supplements that we advocate for fatty liver. Using glitazones in patients without diabetes mellitus has been shown to reduce fat content in the liver but has no effect on fibrosis and is associated with weight gain. Vitamin E has also been used but the data is not very good and most experts no longer advocate this. The need to assess liver fibrosis was discussed.   Sheer wave elastography can assess liver fibrosis and determine if a patient has advanced fibrosis or cirrhosis without the need for liver biopsy. Sheer wave elastography is now available at [facility]. Elastography can be repeated annually to assess for fibrosis progression and need for treatment of the liver disorder. Treatment of other medical problems in patients with chronic liver disease  There are no contraindications for the patient to take any medications that are necessary for treatment of other medical issues. The patient can take oral diabetic agents for treatment of diabetes and statins for treatment of hypercholesterolemia. This will not impact the patient's current liver disease. The patient consumes alcohol on a regular basis. This increases the risk of toxicity from acetaminophen. This analgesic should be avoided until the patient has been abstinent from alcohol for 6 months. The patient does not have cirrhosis. Normal doses of NSAIDs can be used for pain as needed. Counseling for alcohol in patients with chronic liver disease  The patient was counseled regarding alcohol consumption and the effect of alcohol on chronic liver disease. The patient was reminded that alcohol can cause fatty liver. Vaccinations   Vaccination for viral hepatitis A is recommended since the patient has no serologic evidence of previous exposure or vaccination with immunity. Vaccination for viral hepatitis B is not needed. The patient has serologic evidence of prior exposure or vaccination with immunity. Routine vaccinations against other bacterial and viral agents can be performed as indicated. Annual flu vaccination should be administered if indicated. ALLERGIES  Allergies   Allergen Reactions    Bee Sting [Sting, Bee] Swelling       MEDICATIONS  Current Outpatient Prescriptions   Medication Sig    losartan 50 mg tab 100 mg, hydroCHLOROthiazide 12.5 mg cap 12.5 mg Take  by mouth daily.     allopurinol (ZYLOPRIM) 100 mg tablet Take  by mouth daily.  DEXTROAMPHETAMINE/AMPHETAMINE (AMPHETAMINE SALT COMBO PO) Take 15 mg by mouth.  oxyCODONE-acetaminophen (PERCOCET)  mg per tablet Take 1 Tab by mouth every four (4) hours as needed for Pain.  oxyCODONE-acetaminophen (PERCOCET)  mg per tablet Take 1 Tab by mouth every four (4) hours as needed for Pain.  lansoprazole (PREVACID) 30 mg capsule Take 30 mg by mouth Daily (before breakfast). Takes in afternoon    tamsulosin (FLOMAX) 0.4 mg capsule Take 0.4 mg by mouth daily.  levofloxacin (LEVAQUIN) 500 mg tablet Take 500 mg by mouth daily.  atorvastatin (LIPITOR) 40 mg tablet Take 40 mg by mouth daily.  fenofibrate (TRICOR) 160 mg tablet Take 160 mg by mouth daily.  fluticasone (FLONASE) 50 mcg/actuation nasal spray 2 Sprays by Both Nostrils route daily.  HYDROcodone-acetaminophen (NORCO) 5-325 mg per tablet Take 1 Tab by mouth every four (4) hours as needed for Pain.  aspirin delayed-release 81 mg tablet Take 81 mg by mouth daily. No current facility-administered medications for this visit. SYSTEM REVIEW NOT RELATED TO LIVER DISEASE OR REVIEWED ABOVE:  Constitution systems: Negative for fever, chills, weight gain, weight loss. Eyes: Negative for visual changes. ENT: Negative for sore throat, painful swallowing. Respiratory: Negative for cough, hemoptysis, SOB. Cardiology: Negative for chest pain, palpitations. GI:  Negative for constipation or diarrhea. : Negative for urinary frequency, dysuria, hematuria, nocturia. Skin: Negative for rash. Hematology: Negative for easy bruising, blood clots. Musculo-skelatal: Negative for back pain, muscle pain, weakness. Neurologic: Negative for headaches, dizziness, vertigo, memory problems not related to HE. Psychology: Negative for anxiety, depression. FAMILY HISTORY:  The father  of heart disease  The mother  of cancer  There is no family history of liver disease. SOCIAL HISTORY:  The patient is . The patient has 2 children. The patient quit smoking 2004. The patient consumes 2-3 alcoholic beverages per day on average. 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 PHYSICAL EXAMINATION:  Visit Vitals    /81 (BP 1 Location: Left arm, BP Patient Position: Sitting)    Pulse 82    Temp 98 °F (36.7 °C) (Tympanic)    Resp 20    Ht 6' (1.829 m)    Wt 278 lb (126.1 kg)    SpO2 98%    BMI 37.7 kg/m2     General: No acute distress. Eyes: Sclera anicteric. ENT: No oral lesions. Thyroid normal.  Nodes: No adenopathy. Skin: No spider angiomata. No jaundice. No palmar erythema. Respiratory: Lungs clear to auscultation. Cardiovascular: Regular heart rate. No murmurs. No JVD. Abdomen: Soft non-tender. Liver size normal to percussion/palpation. Spleen not palpable. No obvious ascites. Extremities: No edema. No muscle wasting. No gross arthritic changes. Neurologic: Alert and oriented. Cranial nerves grossly intact. No asterixis.     LABORATORY STUDIES:  [place] [address] Units 4/4/2018 5/8/2017   WBC 4.6 - 13.2 K/uL  6.9   ANC 1.8 - 8.0 K/UL  4.7   HGB 13.0 - 16.0 g/dL  13.2    - 420 K/uL  251   INR 0.8 - 1.2    1.0   AST 15 - 37 U/L 30 62 (H)   ALT 16 - 61 U/L 44 83 (H)   Alk Phos 45 - 117 U/L 52 49   Bili, Total 0.2 - 1.0 MG/DL 0.5 0.5   Bili, Direct 0.0 - 0.2 MG/DL 0.1 0.1   Albumin 3.4 - 5.0 g/dL 4.4 4.2   BUN 7.0 - 18 MG/DL  21 (H)   Creat 0.6 - 1.3 MG/DL  1.24   Na 136 - 145 mmol/L  142   K 3.5 - 5.5 mmol/L  3.7   Cl 100 - 108 mmol/L  104   CO2 21 - 32 mmol/L  26   Glucose 74 - 99 mg/dL  103 (H)     SEROLOGIES:  Serologies Latest Ref Rng & Units 5/8/2017   Hep A Ab, Total NEGATIVE   NEGATIVE   Hep B Surface Ag <1.00 Index <0.10   Hep B Surface Ag Interp NEG   NEGATIVE   Hep B Core Ab, Total NEGATIVE   NEGATIVE   Hep B Surface Ab >10.0 mIU/mL 55.30   Hep B Surface Ab Interp POS   POSITIVE   Ferritin 8 - 388 NG/ (H)   Iron % Saturation % 13   JOEL, IFA  NEGATIVE   ASMCA 0 - 19 Units 19   M2 Ab 0.0 - 20.0 Units 4.5   Ceruloplasmin 16.0 - 31.0 mg/dL 27.3   Alpha-1 antitrypsin level 90 - 200 mg/dL 142     LIVER HISTOLOGY:  7/2017. Sheer wave elastography performed at [facility]. E Range: 4.2-7 kPa, E Mean: 5.4 kPa, E Median: 5.4 kPa, E Std: 0.96 kPa. The results suggested a fibrosis level of F0-1.  9/2018. Sheer wave elastography performed at [facility]. E Range: 4.7-6.68 kPa, E Mean: 5.43 kPa, E Median: 5.31 kPa, E Std: 0.59 kPa. The results suggested a fibrosis level of F0-1. ENDOSCOPIC PROCEDURES:  Not available or performed    RADIOLOGY:  7/2017. Ultrasound of liver. Echogenic consistent with fatty liver. No liver mass lesions. No dilated bile ducts. No ascites. 9/2018. Ultrasound of liver. Echogenic consistent with fatty liver. No liver mass lesions. No dilated bile ducts. No ascites. OTHER TESTING:  Not available or performed    FOLLOW UP:  All of the issues listed in the Assessment and Plan were discussed with the patient. All questions were answered. The patient expressed a clear understanding of the above. Follow-up [facility] [address] in 1- 2 years for elastography. The patient will have his labs monitored by his primary care physician.        ROSELYN Alvarado-JAYANT [address], [address] [place] [address], [address] [place] [address] - [address]  [phone]  [address] [place] [address]

## 2018-10-10 PROBLEM — K76.0 NAFL (NONALCOHOLIC FATTY LIVER): Status: ACTIVE | Noted: 2018-10-10

## 2019-02-01 ENCOUNTER — HOSPITAL ENCOUNTER (OUTPATIENT)
Age: 59
Setting detail: OUTPATIENT SURGERY
Discharge: HOME | End: 2019-02-01
Attending: INTERNAL MEDICINE | Admitting: INTERNAL MEDICINE
Payer: COMMERCIAL

## 2019-02-01 VITALS
WEIGHT: 284.13 LBS | DIASTOLIC BLOOD PRESSURE: 75 MMHG | TEMPERATURE: 97.5 F | HEIGHT: 72 IN | OXYGEN SATURATION: 97 % | SYSTOLIC BLOOD PRESSURE: 129 MMHG | HEART RATE: 71 BPM | RESPIRATION RATE: 14 BRPM | BODY MASS INDEX: 38.48 KG/M2

## 2019-02-01 PROCEDURE — 74011250636 HC RX REV CODE- 250/636

## 2019-02-01 PROCEDURE — 76040000007: Performed by: INTERNAL MEDICINE

## 2019-02-01 PROCEDURE — 77030032490 HC SLV COMPR SCD KNE COVD -B: Performed by: INTERNAL MEDICINE

## 2019-02-01 PROCEDURE — 74011250636 HC RX REV CODE- 250/636: Performed by: INTERNAL MEDICINE

## 2019-02-01 PROCEDURE — 88305 TISSUE EXAM BY PATHOLOGIST: CPT

## 2019-02-01 PROCEDURE — 77030013991 HC SNR POLYP ENDOSC BSC -A: Performed by: INTERNAL MEDICINE

## 2019-02-01 PROCEDURE — 77030020256 HC SOL INJ NACL 0.9%  500ML: Performed by: INTERNAL MEDICINE

## 2019-02-01 PROCEDURE — 99153 MOD SED SAME PHYS/QHP EA: CPT | Performed by: INTERNAL MEDICINE

## 2019-02-01 PROCEDURE — G0500 MOD SEDAT ENDO SERVICE >5YRS: HCPCS | Performed by: INTERNAL MEDICINE

## 2019-02-01 RX ORDER — FLUMAZENIL 0.1 MG/ML
0.2 INJECTION INTRAVENOUS
Status: CANCELLED | OUTPATIENT
Start: 2019-02-01 | End: 2019-02-01

## 2019-02-01 RX ORDER — DIPHENHYDRAMINE HYDROCHLORIDE 50 MG/ML
50 INJECTION, SOLUTION INTRAMUSCULAR; INTRAVENOUS ONCE
Status: CANCELLED | OUTPATIENT
Start: 2019-02-01 | End: 2019-02-01

## 2019-02-01 RX ORDER — SODIUM CHLORIDE 0.9 % (FLUSH) 0.9 %
5-40 SYRINGE (ML) INJECTION EVERY 8 HOURS
Status: CANCELLED | OUTPATIENT
Start: 2019-02-01

## 2019-02-01 RX ORDER — EPINEPHRINE 0.1 MG/ML
1 INJECTION INTRACARDIAC; INTRAVENOUS
Status: CANCELLED | OUTPATIENT
Start: 2019-02-01 | End: 2019-02-02

## 2019-02-01 RX ORDER — DEXTROMETHORPHAN/PSEUDOEPHED 2.5-7.5/.8
1.2 DROPS ORAL
Status: CANCELLED | OUTPATIENT
Start: 2019-02-01

## 2019-02-01 RX ORDER — FENTANYL CITRATE 50 UG/ML
100 INJECTION, SOLUTION INTRAMUSCULAR; INTRAVENOUS
Status: CANCELLED | OUTPATIENT
Start: 2019-02-01 | End: 2019-02-01

## 2019-02-01 RX ORDER — NALOXONE HYDROCHLORIDE 0.4 MG/ML
0.4 INJECTION, SOLUTION INTRAMUSCULAR; INTRAVENOUS; SUBCUTANEOUS
Status: CANCELLED | OUTPATIENT
Start: 2019-02-01 | End: 2019-02-01

## 2019-02-01 RX ORDER — SODIUM CHLORIDE 0.9 % (FLUSH) 0.9 %
5-40 SYRINGE (ML) INJECTION AS NEEDED
Status: CANCELLED | OUTPATIENT
Start: 2019-02-01

## 2019-02-01 RX ORDER — ATROPINE SULFATE 0.1 MG/ML
0.5 INJECTION INTRAVENOUS
Status: CANCELLED | OUTPATIENT
Start: 2019-02-01 | End: 2019-02-02

## 2019-02-01 RX ORDER — FLUMAZENIL 0.1 MG/ML
0.2 INJECTION INTRAVENOUS
Status: DISCONTINUED | OUTPATIENT
Start: 2019-02-01 | End: 2019-02-01 | Stop reason: HOSPADM

## 2019-02-01 RX ORDER — NALOXONE HYDROCHLORIDE 0.4 MG/ML
0.4 INJECTION, SOLUTION INTRAMUSCULAR; INTRAVENOUS; SUBCUTANEOUS
Status: DISCONTINUED | OUTPATIENT
Start: 2019-02-01 | End: 2019-02-01 | Stop reason: HOSPADM

## 2019-02-01 RX ORDER — FENTANYL CITRATE 50 UG/ML
100 INJECTION, SOLUTION INTRAMUSCULAR; INTRAVENOUS
Status: DISCONTINUED | OUTPATIENT
Start: 2019-02-01 | End: 2019-02-01 | Stop reason: HOSPADM

## 2019-02-01 RX ORDER — MIDAZOLAM HYDROCHLORIDE 1 MG/ML
.25-5 INJECTION, SOLUTION INTRAMUSCULAR; INTRAVENOUS
Status: CANCELLED | OUTPATIENT
Start: 2019-02-01 | End: 2019-02-01

## 2019-02-01 RX ORDER — MIDAZOLAM HYDROCHLORIDE 1 MG/ML
.25-5 INJECTION, SOLUTION INTRAMUSCULAR; INTRAVENOUS
Status: DISCONTINUED | OUTPATIENT
Start: 2019-02-01 | End: 2019-02-01 | Stop reason: HOSPADM

## 2019-02-01 RX ORDER — SODIUM CHLORIDE 9 MG/ML
1000 INJECTION, SOLUTION INTRAVENOUS CONTINUOUS
Status: CANCELLED | OUTPATIENT
Start: 2019-02-01 | End: 2019-02-01

## 2019-02-01 RX ORDER — SODIUM CHLORIDE 9 MG/ML
125 INJECTION, SOLUTION INTRAVENOUS CONTINUOUS
Status: DISCONTINUED | OUTPATIENT
Start: 2019-02-01 | End: 2019-02-01 | Stop reason: HOSPADM

## 2019-02-01 RX ADMIN — SODIUM CHLORIDE 125 ML/HR: 900 INJECTION, SOLUTION INTRAVENOUS at 10:12

## 2019-02-01 NOTE — PROCEDURES
[facility] 
Colonoscopy Procedure Report 
_______________________________________________________ Patient: Toni Cartwright. Attending Physician: Tuan Carranza MD 
 
Patient ID: [id number]                                      Referring Physician: Anay Kaur MD 
 
Exam Date: February 1, 2019 _______________________________________________________ Introduction: A  59 y.o. male patient, presents for outpatient Colonoscopy Indications: Screen colon cancer, average risk and asymptomatic. Last colonoscopy completed 10 years ago  by Dr. Jennifer Miramontes.  unremarkable exam.  BM twice daily. Medical hx includes KAM, fatty liver, hyperlipidemia, HTN. Surgical hx remarkable for appendectomy. No family history of colorectal CA. Consent: The benefits, risks, and alternatives to the procedure were discussed and informed consent was obtained from the patient. Preparation: EKG, pulse, pulse oximetry and blood pressure were monitored throughout the procedure. ASA Classification: Class 2 - . The heart is an S1-S2 and regular heart rate and rhythm. Lungs are clear to auscultation and percussion. Abdomen is soft, nondistended, and nontender. Mental Status: awake, alert, and oriented to person, place, and time Medications: 
· Fentanyl 100 mcg IV before procedure. · Versed 9 mg IV throughout the procedure. Rectal Exam: Normal Rectal Exam. No Blood. Prostate not enlarged Pathology Specimens: One specimen removed. Procedure: The colonoscope was passed with difficulty through the anus under direct visualization and advanced to the cecum. The patient required positioning on the back and external counter pressure to aid in the passage of the scope. The scope was withdrawn and the mucosa was carefully examined. The quality of the preparation was very good. The views were very good.  The patient's toleration of the procedure was very good. The exam was done twice to the cecum. Total time is 28 minutes and withdrawal time is 18 minutes. Findings: 
 
Rectum: Internal hemorrhoids. Sigmoid: Tortuous with moderate sigmoid diverticulosis Descending Colon:  
Normal 
Transverse Colon:  
2 small sessile 5 mm polyps in the transverse colon cold snared Ascending Colon:  
Normal 
Cecum:  
Normal 
Terminal Ileum: Not entered. Unplanned Events: There were no unplanned events. Estimated Blood Loss: None Impressions: 
Small Internal hemorrhoids. Tortuous with moderate sigmoid diverticulosis. 2 small sessile 5 mm polyps in the transverse colon cold snared. Normal Mucosa. Complications: None; patient tolerated the procedure well. Recommendations: · Discharge home when standard parameters are met. · Resume a high fiber diet. · Colonoscopy recommendation in 5 years. Procedure Codes:   
· Cam Dariel [[id number]] Endoscope Information: Model Number(s) [id number] Assistant: None Signed By: Gutierrez Alvarenga MD Date: February 1, 2019

## 2019-02-01 NOTE — DISCHARGE INSTRUCTIONS
[facility]  [id number]  [id number]    COLON DISCHARGE INSTRUCTIONS    Discomfort:  Redness at IV site- apply warm compress to area; if redness or soreness persist- contact your physician  There may be a slight amount of blood passed from the rectum  Gaseous discomfort- walking, belching will help relieve any discomfort  You may not operate a vehicle til the next day. You may not engage in an occupation involving machinery or appliances til the next day. You may not drink alcoholic beverages til the next day. DIET:   High fiber diet. ACTIVITY:  You may not  resume your normal daily activities til the next day. it is recommended that you spend the remainder of the day resting -  avoid any strenuous activity. CALL M.D.  IF ANY SIGN OF:   Increasing pain, nausea, vomiting  Abdominal distension (swelling)  New increased bleeding (oral or rectal)  Fever (chills)  Pain in chest area  Bloody discharge from nose or mouth  Shortness of breath    You may not  take any Advil, Aspirin, Ibuprofen, Motrin, Aleve, or Goodys for 5 days, ONLY  Tylenol as needed for pain. Post procedure diagnosis:  DIVERTICULOSIS, TORTUOUS SIGMOID, POLYPS, REDUNDANT COLON, HEMORRHOIDS,     Follow-up Instructions: Your follow up colonoscopy will be in 5 years. We will notify you the results of your biopsy by letter within 2 weeks. Janice Bradford MD  February 1, 2019   DISCHARGE SUMMARY from Nurse    PATIENT INSTRUCTIONS:    After general anesthesia or intravenous sedation, for 24 hours or while taking prescription Narcotics:  · Limit your activities  · Do not drive and operate hazardous machinery  · Do not make important personal or business decisions  · Do  not drink alcoholic beverages  · If you have not urinated within 8 hours after discharge, please contact your surgeon on call.     Report the following to your surgeon:  · Excessive pain, swelling, redness or odor of or around the surgical area  · Temperature over 100.5  · Nausea and vomiting lasting longer than 4 hours or if unable to take medications  · Any signs of decreased circulation or nerve impairment to extremity: change in color, persistent  numbness, tingling, coldness or increase pain  · Any questions    What to do at Home:  Recommended activity: as above    If you experience any of the following symptoms as above, please follow up with Dr. Duncan Ling. *  Please give a list of your current medications to your Primary Care Provider. *  Please update this list whenever your medications are discontinued, doses are      changed, or new medications (including over-the-counter products) are added. *  Please carry medication information at all times in case of emergency situations. These are general instructions for a healthy lifestyle:    No smoking/ No tobacco products/ Avoid exposure to second hand smoke  Surgeon General's Warning:  Quitting smoking now greatly reduces serious risk to your health. Obesity, smoking, and sedentary lifestyle greatly increases your risk for illness    A healthy diet, regular physical exercise & weight monitoring are important for maintaining a healthy lifestyle    You may be retaining fluid if you have a history of heart failure or if you experience any of the following symptoms:  Weight gain of 3 pounds or more overnight or 5 pounds in a week, increased swelling in our hands or feet or shortness of breath while lying flat in bed. Please call your doctor as soon as you notice any of these symptoms; do not wait until your next office visit. Recognize signs and symptoms of STROKE:    F-face looks uneven    A-arms unable to move or move unevenly    S-speech slurred or non-existent    T-time-call 911 as soon as signs and symptoms begin-DO NOT go       Back to bed or wait to see if you get better-TIME IS BRAIN. Warning Signs of HEART ATTACK     Call 911 if you have these symptoms:   Chest discomfort.  Most heart attacks involve discomfort in the center of the chest that lasts more than a few minutes, or that goes away and comes back. It can feel like uncomfortable pressure, squeezing, fullness, or pain.  Discomfort in other areas of the upper body. Symptoms can include pain or discomfort in one or both arms, the back, neck, jaw, or stomach.  Shortness of breath with or without chest discomfort.  Other signs may include breaking out in a cold sweat, nausea, or lightheadedness. Don't wait more than five minutes to call 911 - MINUTES MATTER! Fast action can save your life. Calling 911 is almost always the fastest way to get lifesaving treatment. Emergency Medical Services staff can begin treatment when they arrive -- up to an hour sooner than if someone gets to the hospital by car. The discharge information has been reviewed with the patient and spouse. The patient and spouse verbalized understanding. Discharge medications reviewed with the patient and spouse and appropriate educational materials and side effects teaching were provided.   Patient armband removed and shredded    ___________________________________________________________________________________________________________________________________

## 2019-02-12 NOTE — PROCEDURES
[facility]  Colonoscopy Procedure Report  _______________________________________________________  Patient: Leatha Li. Attending Physician: Arlette Johnston MD    Patient ID: [id number]                                      Referring Physician: Riri Hutson MD    Exam Date: February 12, 2019 _______________________________________________________      Introduction: A  59 y.o. male patient, presents for outpatient Colonoscopy    Indications: Screen colon cancer, average risk and asymptomatic. Last colonoscopy completed 10 years ago  by Dr. Kayce Howard.  unremarkable exam.  BM twice daily. Medical hx includes KAM, fatty liver, hyperlipidemia, HTN. Surgical hx remarkable for appendectomy. No family history of colorectal CA. Consent: The benefits, risks, and alternatives to the procedure were discussed and informed consent was obtained from the patient. Preparation: EKG, pulse, pulse oximetry and blood pressure were monitored throughout the procedure. ASA Classification: Class 2 - . The heart is an S1-S2 and regular heart rate and rhythm. Lungs are clear to auscultation and percussion. Abdomen is soft, nondistended, and nontender. Mental Status: awake, alert, and oriented to person, place, and time    Medications:  · Fentanyl 100 mcg IV before procedure. · Versed 9 mg IV throughout the procedure. Rectal Exam: Normal Rectal Exam. No Blood. Prostate not enlarged    Pathology Specimens: One specimen removed. Procedure: The colonoscope was passed with difficulty through the anus under direct visualization and advanced to the cecum. The patient required positioning on the back and external counter pressure to aid in the passage of the scope. The scope was withdrawn and the mucosa was carefully examined. The quality of the preparation was very good. The views were very good. The patient's toleration of the procedure was very good.  The exam was done twice to the cecum. Total time is 28 minutes and withdrawal time is 18 minutes. Findings:    Rectum:   Internal hemorrhoids. Sigmoid:   Tortuous with moderate sigmoid diverticulosis  Descending Colon:   Normal  Transverse Colon:   2 small sessile 5 mm polyps in the transverse colon cold snared  Ascending Colon:   Normal  Cecum:   Normal  Terminal Ileum:   Not entered. Unplanned Events: There were no unplanned events. Estimated Blood Loss: None  Impressions:  Small Internal hemorrhoids. Tortuous with moderate sigmoid diverticulosis. 2 small sessile 5 mm polyps in the transverse colon cold snared. Normal Mucosa. Complications: None; patient tolerated the procedure well. Recommendations:  · Discharge home when standard parameters are met. · Resume a high fiber diet. · Colonoscopy recommendation in 5 years.     Procedure Codes:    · Addi Vieira [[id number]]    Endoscope Information:  Model Number(s)    [id number]     Assistant: None    Signed By: Gutierrez Alvarenga MD Date: February 12, 2019

## 2022-03-18 PROBLEM — E78.5 HYPERLIPIDEMIA: Status: ACTIVE | Noted: 2017-05-08

## 2022-03-18 PROBLEM — G47.33 OSA (OBSTRUCTIVE SLEEP APNEA): Status: ACTIVE | Noted: 2017-05-08

## 2022-03-18 PROBLEM — K21.9 GERD (GASTROESOPHAGEAL REFLUX DISEASE): Status: ACTIVE | Noted: 2017-05-08

## 2022-03-19 PROBLEM — K76.0 NAFL (NONALCOHOLIC FATTY LIVER): Status: ACTIVE | Noted: 2018-10-10

## 2022-03-19 PROBLEM — F90.9 ADHD (ATTENTION DEFICIT HYPERACTIVITY DISORDER): Status: ACTIVE | Noted: 2017-05-08

## 2022-03-19 PROBLEM — I10 HTN (HYPERTENSION): Status: ACTIVE | Noted: 2017-05-08

## 2023-11-10 RX ORDER — SIMETHICONE 20 MG/.3ML
40 EMULSION ORAL EVERY 6 HOURS PRN
Status: CANCELLED | OUTPATIENT
Start: 2023-11-10

## 2023-11-10 RX ORDER — GLYCOPYRROLATE 0.2 MG/ML
0.1 INJECTION INTRAMUSCULAR; INTRAVENOUS ONCE
Status: CANCELLED | OUTPATIENT
Start: 2023-11-10 | End: 2023-11-10

## 2023-11-10 RX ORDER — INDOMETHACIN 50 MG/1
100 SUPPOSITORY RECTAL EVERY 12 HOURS PRN
Status: CANCELLED | OUTPATIENT
Start: 2023-11-10

## 2023-11-10 RX ORDER — EPINEPHRINE IN SOD CHLOR,ISO 1 MG/10 ML
1 SYRINGE (ML) INTRAVENOUS ONCE
Status: CANCELLED | OUTPATIENT
Start: 2023-11-10 | End: 2023-11-10

## 2023-11-10 RX ORDER — DIPHENHYDRAMINE HYDROCHLORIDE 50 MG/ML
25 INJECTION INTRAMUSCULAR; INTRAVENOUS EVERY 6 HOURS PRN
Status: CANCELLED | OUTPATIENT
Start: 2023-11-10

## 2023-11-14 ENCOUNTER — HOSPITAL ENCOUNTER (OUTPATIENT)
Facility: HOSPITAL | Age: 63
Setting detail: OUTPATIENT SURGERY
Discharge: HOME | End: 2023-11-14
Attending: INTERNAL MEDICINE | Admitting: INTERNAL MEDICINE
Payer: COMMERCIAL

## 2023-11-14 VITALS
WEIGHT: 272.3 LBS | BODY MASS INDEX: 36.88 KG/M2 | RESPIRATION RATE: 18 BRPM | DIASTOLIC BLOOD PRESSURE: 74 MMHG | HEART RATE: 61 BPM | HEIGHT: 72 IN | SYSTOLIC BLOOD PRESSURE: 114 MMHG | OXYGEN SATURATION: 97 % | TEMPERATURE: 98 F

## 2023-11-14 LAB — GLUCOSE BLD STRIP.AUTO-MCNC: 120 MG/DL (ref 70–110)

## 2023-11-14 PROCEDURE — 2709999900 HC NON-CHARGEABLE SUPPLY: Performed by: INTERNAL MEDICINE

## 2023-11-14 PROCEDURE — 6360000002 HC RX W HCPCS: Performed by: INTERNAL MEDICINE

## 2023-11-14 PROCEDURE — 99152 MOD SED SAME PHYS/QHP 5/>YRS: CPT | Performed by: INTERNAL MEDICINE

## 2023-11-14 PROCEDURE — 2580000003 HC RX 258: Performed by: INTERNAL MEDICINE

## 2023-11-14 PROCEDURE — 3600007502: Performed by: INTERNAL MEDICINE

## 2023-11-14 PROCEDURE — 7100000011 HC PHASE II RECOVERY - ADDTL 15 MIN: Performed by: INTERNAL MEDICINE

## 2023-11-14 PROCEDURE — 82962 GLUCOSE BLOOD TEST: CPT

## 2023-11-14 PROCEDURE — 3600007512: Performed by: INTERNAL MEDICINE

## 2023-11-14 PROCEDURE — 88305 TISSUE EXAM BY PATHOLOGIST: CPT

## 2023-11-14 PROCEDURE — 7100000010 HC PHASE II RECOVERY - FIRST 15 MIN: Performed by: INTERNAL MEDICINE

## 2023-11-14 PROCEDURE — 99153 MOD SED SAME PHYS/QHP EA: CPT | Performed by: INTERNAL MEDICINE

## 2023-11-14 RX ORDER — LOSARTAN POTASSIUM AND HYDROCHLOROTHIAZIDE 12.5; 1 MG/1; MG/1
TABLET ORAL
COMMUNITY
Start: 2023-08-07

## 2023-11-14 RX ORDER — NALOXONE HYDROCHLORIDE 0.4 MG/ML
0.4 INJECTION, SOLUTION INTRAMUSCULAR; INTRAVENOUS; SUBCUTANEOUS PRN
Status: DISCONTINUED | OUTPATIENT
Start: 2023-11-14 | End: 2023-11-14 | Stop reason: HOSPADM

## 2023-11-14 RX ORDER — FENTANYL CITRATE 50 UG/ML
100 INJECTION, SOLUTION INTRAMUSCULAR; INTRAVENOUS
Status: DISCONTINUED | OUTPATIENT
Start: 2023-11-14 | End: 2023-11-14 | Stop reason: HOSPADM

## 2023-11-14 RX ORDER — METFORMIN HYDROCHLORIDE 750 MG/1
TABLET, EXTENDED RELEASE ORAL
COMMUNITY
Start: 2023-09-14

## 2023-11-14 RX ORDER — ALLOPURINOL 100 MG/1
TABLET ORAL
COMMUNITY
Start: 2023-09-04

## 2023-11-14 RX ORDER — DULAGLUTIDE 0.75 MG/.5ML
INJECTION, SOLUTION SUBCUTANEOUS
COMMUNITY
Start: 2023-10-21

## 2023-11-14 RX ORDER — LANSOPRAZOLE 30 MG/1
CAPSULE, DELAYED RELEASE ORAL
COMMUNITY
Start: 2023-10-20

## 2023-11-14 RX ORDER — TESTOSTERONE CYPIONATE 200 MG/ML
INJECTION, SOLUTION INTRAMUSCULAR
COMMUNITY
Start: 2023-10-27

## 2023-11-14 RX ORDER — MIDAZOLAM HYDROCHLORIDE 1 MG/ML
5 INJECTION, SOLUTION INTRAMUSCULAR; INTRAVENOUS
Status: DISCONTINUED | OUTPATIENT
Start: 2023-11-14 | End: 2023-11-14 | Stop reason: HOSPADM

## 2023-11-14 RX ORDER — FENOFIBRATE 160 MG/1
TABLET ORAL
COMMUNITY
Start: 2023-10-14

## 2023-11-14 RX ORDER — FENTANYL CITRATE 50 UG/ML
INJECTION, SOLUTION INTRAMUSCULAR; INTRAVENOUS PRN
Status: DISCONTINUED | OUTPATIENT
Start: 2023-11-14 | End: 2023-11-14 | Stop reason: ALTCHOICE

## 2023-11-14 RX ORDER — MIDAZOLAM HYDROCHLORIDE 1 MG/ML
INJECTION INTRAMUSCULAR; INTRAVENOUS PRN
Status: DISCONTINUED | OUTPATIENT
Start: 2023-11-14 | End: 2023-11-14 | Stop reason: ALTCHOICE

## 2023-11-14 RX ORDER — ATORVASTATIN CALCIUM 80 MG/1
TABLET, FILM COATED ORAL
COMMUNITY
Start: 2023-11-07

## 2023-11-14 RX ORDER — DEXTROAMPHETAMINE SACCHARATE, AMPHETAMINE ASPARTATE, DEXTROAMPHETAMINE SULFATE AND AMPHETAMINE SULFATE 5; 5; 5; 5 MG/1; MG/1; MG/1; MG/1
TABLET ORAL
COMMUNITY
Start: 2023-10-23

## 2023-11-14 RX ORDER — ATENOLOL 25 MG/1
TABLET ORAL
COMMUNITY
Start: 2023-11-03

## 2023-11-14 RX ORDER — AMLODIPINE BESYLATE 10 MG/1
TABLET ORAL
COMMUNITY
Start: 2023-09-14

## 2023-11-14 RX ORDER — SODIUM CHLORIDE 9 MG/ML
INJECTION, SOLUTION INTRAVENOUS CONTINUOUS
Status: DISCONTINUED | OUTPATIENT
Start: 2023-11-14 | End: 2023-11-14 | Stop reason: HOSPADM

## 2023-11-14 RX ORDER — TADALAFIL 20 MG/1
TABLET ORAL
COMMUNITY
Start: 2023-08-14

## 2023-11-14 RX ORDER — FLUMAZENIL 0.1 MG/ML
0.2 INJECTION INTRAVENOUS ONCE
Status: DISCONTINUED | OUTPATIENT
Start: 2023-11-14 | End: 2023-11-14 | Stop reason: HOSPADM

## 2023-11-14 RX ADMIN — SODIUM CHLORIDE: 9 INJECTION, SOLUTION INTRAVENOUS at 08:28

## 2023-11-14 ASSESSMENT — PAIN SCALES - GENERAL: PAINLEVEL_OUTOF10: 0

## 2023-11-14 ASSESSMENT — PAIN - FUNCTIONAL ASSESSMENT: PAIN_FUNCTIONAL_ASSESSMENT: 0-10

## 2023-11-14 NOTE — PRE SEDATION
tablet  8/7/23   Slava Ponce MD   metFORMIN (GLUCOPHAGE-XR) 750 MG extended release tablet TAKE 1 TABLET BY MOUTH EVERY DAY WITH EVENING MEAL 9/14/23   Slava Ponce MD   tadalafil (CIALIS) 20 MG tablet  8/14/23   Slava Ponce MD   testosterone cypionate (DEPOTESTOTERONE CYPIONATE) 200 MG/ML injection INJECT 1.5 MILLILITER BY INTRAMUSCULAR ROUTE EVERY 3 WEEKS 10/27/23   Slava Ponce MD     Coumadin Use Last 7 Days:  no  Antiplatelet drug therapy use last 7 days: no  Other anticoagulant use last 7 days: no  Additional Medication Information:  None      Pre-Sedation Documentation and Exam:   Vital signs have been reviewed (see flow sheet for vitals). I have reviewed the patient's history and review of systems.     Mallampati Airway Assessment:  normal, dentition not prohibitive, normal neck range of motion, Mallampati Class II - (soft palate, fauces & uvula are visible)    Prior History of Anesthesia Complications:   none    ASA Classification:  Class 2 - A normal healthy patient with mild systemic disease    Sedation/ Anesthesia Plan:   intravenous sedation    Medications Planned:   midazolam (Versed) intravenously and fentanyl intravenously    Patient is an appropriate candidate for plan of sedation: yes    Electronically signed by Mari Berger MD on 11/14/2023 at 8:46 AM

## 2023-11-14 NOTE — H&P
Assessment/Plan  # Detail Type Description    1. Assessment Personal history of colonic polyps (Z86.010). Impression Small Internal hemorrhoids. Tortuous with moderate sigmoid diverticulosis. 2 small sessile 5 mm polyps in the transverse colon cold snared. Normal Mucosa. TRANSVERSE COLON, BIOPSY:      TUBULAR ADENOMA. .    Patient Plan He has one bm daily. No fx hx of colon cancer. He is diabetic for 8 years has HTN BMI 37. he had left hip replacement. I explained the procedure of colonoscopy, where there is a chance of taking a biopsy, injecting, dilating or removing polyps. I explained also the risk and benefits involved which include but not limited to reaction to medication/ sedation, bleeding, perforation, where there may be a need for surgery. There is also a chance of missing a lesion or a polyp if the bowel prep is inadequate or the colon is unusually tortuous and difficult. I answered his questions. He was agreeable to proceed with the test. I gave him the Suprep to clean the bowels. He may have to take extra laxatives the days before to assure that the bowel prep is as perfect as possible. Plan Orders He will be scheduled for GASTROENTEROLOGY PROCEDURE, Next test date is within 1 Week on 11/14/2023. Clinical information/comments: at location [facility]. The surgeon scheduled is Cyndee Coyne MD. An assistant has not been requested. 2. Other Orders Orders not associated to today's assessments. Plan Orders Today's instructions / counseling include(s) . Clinical information/comments: Bowel prep before procedure: polyethylene glycol. Hold oral anticoagulation medication: to be determined by PCP. Hold antiplatelet medication: to be determined by PCP. Hold oral diabetic medication: to be determined by PCP. This 57year old patient presents for Hx polyp/colon cancer. History of Present Illness  1. Hx polyp/colon cancer   Prior screening:  colonoscopy.   Risk

## 2023-11-14 NOTE — PROCEDURES
[facility]  Colonoscopy Procedure Report  _______________________________________________________  Patient: Vj Ambrosio. Attending Physician: Jasmine Vasquez MD    Patient ID: [id number]                                    Referring Physician: Giovanni Rios MD    Exam Date: 11/14/2023     Introduction: A  57 y.o. male patient, presents for inpatient Colonoscopy    Indications: 58 yo male last colonoscopy  2/1/ 2019 with difficulty: small Internal hemorrhoids. Tortuous with moderate sigmoid diverticulosis. 2 small sessile 5 mm polyps in the transverse colon cold snared. Normal Mucosa. TRANSVERSE COLON, BIOPSY:      TUBULAR ADENOMA  one bm daily. No fx hx of colon cancer. He is diabetic for 8 years has HTN BMI 37    Consent: The benefits, risks, and alternatives to the procedure were discussed and informed consent was obtained from the patient. Preparation: EKG, pulse, pulse oximetry and blood pressure were monitored throughout the procedure. ASA Classification: Class II- . The heart is an S1-S2 and regular heart rate and rhythm. Lungs are clear to auscultation and percussion. Abdomen is soft, nondistended, and nontender. Mental Status: awake, alert, and oriented to person, place, and time    Medications:  Fentanyl 100 mcg IV before procedure. Versed 7 mg IV throughout the procedure. Rectal Exam: Normal Rectal Exam. No Blood. Prostate not enlarged    Pathology Specimens:  2    Procedure: The colonoscope was passed with mild difficulty through the anus under direct visualization and advanced to the cecum and 5 cm inside the terminal ileum. The scope was withdrawn and the mucosa was carefully examined. The quality of the preparation was excellent. The views were excellent. The patient's toleration of the procedure was excellent. The exam was done twice to the cecum.  Total time is 20 minutes and withdrawal time is 15

## (undated) DEVICE — MEDI-VAC NON-CONDUCTIVE SUCTION TUBING: Brand: CARDINAL HEALTH

## (undated) DEVICE — TRAP SPEC COLL POLYP POLYSTYR --

## (undated) DEVICE — SINGLE PORT MANIFOLD: Brand: NEPTUNE 2

## (undated) DEVICE — SYRINGE MED 5ML STD CLR PLAS LUERLOCK TIP N CTRL DISP

## (undated) DEVICE — SNARE POLYP SM W13MMXL240CM SHTH DIA2.4MM OVL FLX DISP

## (undated) DEVICE — NDL PRT INJ NSAF BLNT 18GX1.5 --

## (undated) DEVICE — SOLUTION IV 500ML 0.9% SOD CHL FLX CONT

## (undated) DEVICE — TRNQT TEXT 1X18IN BLU LF DISP -- CONVERT TO ITEM 362165

## (undated) DEVICE — CATH SUC CTRL PRT TRIFLO 14FR --

## (undated) DEVICE — WRISTBAND ID AD W2.5XL9.5CM RED VYN ADH CLSR UNI-PRINT

## (undated) DEVICE — KENDALL RADIOLUCENT FOAM MONITORING ELECTRODE RECTANGULAR SHAPE: Brand: KENDALL

## (undated) DEVICE — CATH IV SAFE STR 22GX1IN BLU -- PROTECTIV PLUS

## (undated) DEVICE — SYR 3ML LL TIP 1/10ML GRAD --

## (undated) DEVICE — SPONGE GZ W4XL4IN COT 12 PLY TYP VII WVN C FLD DSGN

## (undated) DEVICE — ERBE NESSY®PLATE 170 SPLIT; 168CM²; CABLE 3M: Brand: ERBE

## (undated) DEVICE — NDL FLTR TIP 5 MIC 18GX1.5IN --

## (undated) DEVICE — TOURNIQUET PHLEB W1XL18IN BLU FLAT RL AND BND REUSE FOR IV

## (undated) DEVICE — MAJ-1414 SINGLE USE ADPATER BIOPSY VALV: Brand: SINGLE USE ADAPTOR BIOPSY VALVE

## (undated) DEVICE — SOLUTION IV 1000ML 20MEQ K CHL IN 0.9% SOD CHL FLX CONT

## (undated) DEVICE — CATHETER PH SUCT 14FR

## (undated) DEVICE — SYRINGE 50ML E/T

## (undated) DEVICE — SYRINGE MED 3ML CLR PLAS STD N CTRL LUERLOCK TIP DISP

## (undated) DEVICE — CANNULA CUSH AD W/ 14FT TBG

## (undated) DEVICE — ENDO CARRY-ON PROCEDURE KIT INCLUDES ENZYMATIC SPONGE, GAUZE, BIOHAZARD LABEL, TRAY, LUBRICANT, DIRTY SCOPE LABEL, WATER LABEL, TRAY, DRAWSTRING PAD, AND DEFENDO 4-PIECE KIT.: Brand: ENDO CARRY-ON PROCEDURE KIT

## (undated) DEVICE — GAUZE SPNG AVANT 4X4 4PLY NS --

## (undated) DEVICE — Device

## (undated) DEVICE — CATHETER IV 22GA L1IN BLU POLYUR STR HUB RADPQ PROTCT +

## (undated) DEVICE — TUBING, SUCTION, 1/4" X 12', STRAIGHT: Brand: MEDLINE

## (undated) DEVICE — SET ADMIN 16ML TBNG L100IN 2 Y INJ SITE IV PIGGY BK DISP

## (undated) DEVICE — SYR 5ML 1/5 GRAD LL NSAF LF --

## (undated) DEVICE — KENDALL SCD EXPRESS SLEEVES, KNEE LENGTH, MEDIUM: Brand: KENDALL SCD

## (undated) DEVICE — BLUNTFILL: Brand: MONOJECT